# Patient Record
Sex: FEMALE | Race: WHITE | Employment: FULL TIME | URBAN - METROPOLITAN AREA
[De-identification: names, ages, dates, MRNs, and addresses within clinical notes are randomized per-mention and may not be internally consistent; named-entity substitution may affect disease eponyms.]

---

## 2019-11-19 NOTE — PERIOP NOTES
Patient does not have her medication list with her at this time. She will provide during admission process. [address] [place] [address] Dr Mittal Preprocedure Instructions      1. On the day of your surgery, please report to registration located on the 2nd floor of the  [facility]. yes    2. You must have a responsible adult to drive you to the hospital, stay at the hospital during your procedure and drive you home. If they leave your procedure will not be started (no exceptions). yes    3. Do not have anything to eat or drink (including water, gum, mints, coffee, and juice) after midnight. This does not apply to the medications you were instructed to take by your physician. yesIf you are currently taking Plavix, Coumadin, Aspirin, or other blood-thinning agents, contact your physician for special instructions. Yes. Plavix last dosed on 11/18/2019. 4. If you are having a procedure that requires bowel prep: We recommend that you have only clear liquids the day before your procedure and begin your bowel prep by 5:00 pm.  You may continue to drink clear liquids until midnight. If for any reason you are not able to complete your prep please notify your physician immediately. not applicable    5. Have a list of all current medications, including vitamins, herbal supplements and any other over the counter medications. yes    6. If you wear glasses, contacts, dentures and/or hearing aids, they may be removed prior to procedure, please bring a case to store them in. yes    7. You should understand that if you do not follow these instructions your procedure may be cancelled. If your physical condition changes (I.e. fever, cold or flu) please contact your doctor as soon as possible. 8. It is important that you be on time.   If for any reason you are unable to keep your appointment please call [phone] the day of or your physicians office prior to your scheduled procedure

## 2019-11-21 ENCOUNTER — HOSPITAL ENCOUNTER (OUTPATIENT)
Age: 64
Setting detail: OUTPATIENT SURGERY
Discharge: HOME | End: 2019-11-21
Attending: INTERNAL MEDICINE | Admitting: INTERNAL MEDICINE
Payer: COMMERCIAL

## 2019-11-21 ENCOUNTER — APPOINTMENT (OUTPATIENT)
Dept: GENERAL RADIOLOGY | Age: 64
End: 2019-11-21
Attending: INTERNAL MEDICINE
Payer: COMMERCIAL

## 2019-11-21 ENCOUNTER — ANESTHESIA EVENT (OUTPATIENT)
Dept: ENDOSCOPY | Age: 64
End: 2019-11-21
Payer: COMMERCIAL

## 2019-11-21 ENCOUNTER — ANESTHESIA (OUTPATIENT)
Dept: ENDOSCOPY | Age: 64
End: 2019-11-21
Payer: COMMERCIAL

## 2019-11-21 VITALS
RESPIRATION RATE: 19 BRPM | DIASTOLIC BLOOD PRESSURE: 82 MMHG | HEART RATE: 61 BPM | OXYGEN SATURATION: 97 % | WEIGHT: 171.08 LBS | SYSTOLIC BLOOD PRESSURE: 164 MMHG | BODY MASS INDEX: 28.5 KG/M2 | TEMPERATURE: 98.8 F | HEIGHT: 65 IN

## 2019-11-21 PROCEDURE — 77030010603 HC BLN DEV INFL BSC -B: Performed by: INTERNAL MEDICINE

## 2019-11-21 PROCEDURE — 76040000007: Performed by: INTERNAL MEDICINE

## 2019-11-21 PROCEDURE — C2625 STENT, NON-COR, TEM W/DEL SY: HCPCS | Performed by: INTERNAL MEDICINE

## 2019-11-21 PROCEDURE — 74011250637 HC RX REV CODE- 250/637: Performed by: INTERNAL MEDICINE

## 2019-11-21 PROCEDURE — 77030009038 HC CATH BILI STN RTVR BSC -C: Performed by: INTERNAL MEDICINE

## 2019-11-21 PROCEDURE — 74011636320 HC RX REV CODE- 636/320: Performed by: INTERNAL MEDICINE

## 2019-11-21 PROCEDURE — 77030007288 HC DEV LOK BILI BSC -A: Performed by: INTERNAL MEDICINE

## 2019-11-21 PROCEDURE — 77030012596 HC SPHNTOM BILI BSC -E: Performed by: INTERNAL MEDICINE

## 2019-11-21 PROCEDURE — 74011250636 HC RX REV CODE- 250/636: Performed by: INTERNAL MEDICINE

## 2019-11-21 PROCEDURE — 77030010104 HC SEAL PRT ENDOSC BYRN -B: Performed by: INTERNAL MEDICINE

## 2019-11-21 PROCEDURE — 74011000250 HC RX REV CODE- 250: Performed by: NURSE ANESTHETIST, CERTIFIED REGISTERED

## 2019-11-21 PROCEDURE — 76060000032 HC ANESTHESIA 0.5 TO 1 HR: Performed by: INTERNAL MEDICINE

## 2019-11-21 PROCEDURE — 74011250636 HC RX REV CODE- 250/636: Performed by: NURSE ANESTHETIST, CERTIFIED REGISTERED

## 2019-11-21 PROCEDURE — 77030013992 HC SNR POLYP ENDOSC BSC -B: Performed by: INTERNAL MEDICINE

## 2019-11-21 DEVICE — BILIARY STENT WITH NAVIFLEXTM RX DELIVERY SYSTEM
Type: IMPLANTABLE DEVICE | Site: BILE DUCT | Status: FUNCTIONAL
Brand: ADVANIX™ BILIARY

## 2019-11-21 RX ORDER — DULOXETIN HYDROCHLORIDE 60 MG/1
60 CAPSULE, DELAYED RELEASE ORAL DAILY
COMMUNITY

## 2019-11-21 RX ORDER — PYRIDOXINE HCL (VITAMIN B6) 100 MG
100 TABLET ORAL DAILY
COMMUNITY

## 2019-11-21 RX ORDER — CARVEDILOL 6.25 MG/1
6.25 TABLET ORAL 2 TIMES DAILY WITH MEALS
COMMUNITY

## 2019-11-21 RX ORDER — LIDOCAINE HYDROCHLORIDE 20 MG/ML
INJECTION, SOLUTION EPIDURAL; INFILTRATION; INTRACAUDAL; PERINEURAL AS NEEDED
Status: DISCONTINUED | OUTPATIENT
Start: 2019-11-21 | End: 2019-11-21 | Stop reason: HOSPADM

## 2019-11-21 RX ORDER — ALBUTEROL SULFATE 90 UG/1
2 AEROSOL, METERED RESPIRATORY (INHALATION)
COMMUNITY

## 2019-11-21 RX ORDER — VALACYCLOVIR HYDROCHLORIDE 500 MG/1
TABLET, FILM COATED ORAL 2 TIMES DAILY
COMMUNITY

## 2019-11-21 RX ORDER — HEPARIN 100 UNIT/ML
300 SYRINGE INTRAVENOUS AS NEEDED
Status: DISCONTINUED | OUTPATIENT
Start: 2019-11-21 | End: 2019-11-21 | Stop reason: HOSPADM

## 2019-11-21 RX ORDER — SUCCINYLCHOLINE CHLORIDE 20 MG/ML
INJECTION INTRAMUSCULAR; INTRAVENOUS AS NEEDED
Status: DISCONTINUED | OUTPATIENT
Start: 2019-11-21 | End: 2019-11-21 | Stop reason: HOSPADM

## 2019-11-21 RX ORDER — DIPHENHYDRAMINE HYDROCHLORIDE 50 MG/ML
50 INJECTION, SOLUTION INTRAMUSCULAR; INTRAVENOUS ONCE
Status: COMPLETED | OUTPATIENT
Start: 2019-11-21 | End: 2019-11-21

## 2019-11-21 RX ORDER — EZETIMIBE 10 MG/1
TABLET ORAL
COMMUNITY

## 2019-11-21 RX ORDER — MONTELUKAST SODIUM 10 MG/1
10 TABLET ORAL DAILY
COMMUNITY

## 2019-11-21 RX ORDER — PROPOFOL 10 MG/ML
INJECTION, EMULSION INTRAVENOUS AS NEEDED
Status: DISCONTINUED | OUTPATIENT
Start: 2019-11-21 | End: 2019-11-21 | Stop reason: HOSPADM

## 2019-11-21 RX ORDER — PANTOPRAZOLE SODIUM 40 MG/1
40 TABLET, DELAYED RELEASE ORAL DAILY
COMMUNITY

## 2019-11-21 RX ORDER — SODIUM CHLORIDE, SODIUM LACTATE, POTASSIUM CHLORIDE, CALCIUM CHLORIDE 600; 310; 30; 20 MG/100ML; MG/100ML; MG/100ML; MG/100ML
INJECTION, SOLUTION INTRAVENOUS
Status: DISCONTINUED | OUTPATIENT
Start: 2019-11-21 | End: 2019-11-21 | Stop reason: HOSPADM

## 2019-11-21 RX ORDER — PROCHLORPERAZINE MALEATE 10 MG
5 TABLET ORAL
COMMUNITY

## 2019-11-21 RX ORDER — ATORVASTATIN CALCIUM 10 MG/1
10 TABLET, FILM COATED ORAL DAILY
COMMUNITY

## 2019-11-21 RX ORDER — CHOLECALCIFEROL (VITAMIN D3) 125 MCG
CAPSULE ORAL
COMMUNITY

## 2019-11-21 RX ORDER — CLOPIDOGREL BISULFATE 75 MG/1
75 TABLET ORAL
COMMUNITY

## 2019-11-21 RX ORDER — DOCUSATE SODIUM 100 MG/1
100 CAPSULE, LIQUID FILLED ORAL 2 TIMES DAILY
Status: ON HOLD | COMMUNITY
End: 2020-03-26

## 2019-11-21 RX ORDER — EPINEPHRINE 0.3 MG/.3ML
0.3 INJECTION SUBCUTANEOUS
COMMUNITY

## 2019-11-21 RX ORDER — ONDANSETRON HYDROCHLORIDE 8 MG/1
8 TABLET, FILM COATED ORAL
COMMUNITY

## 2019-11-21 RX ADMIN — INDOMETHACIN 100 MG: 50 SUPPOSITORY RECTAL at 16:34

## 2019-11-21 RX ADMIN — SODIUM CHLORIDE, POTASSIUM CHLORIDE, SODIUM LACTATE AND CALCIUM CHLORIDE: 600; 310; 30; 20 INJECTION, SOLUTION INTRAVENOUS at 16:25

## 2019-11-21 RX ADMIN — SUCCINYLCHOLINE CHLORIDE 100 MG: 20 INJECTION, SOLUTION INTRAMUSCULAR; INTRAVENOUS; PARENTERAL at 16:29

## 2019-11-21 RX ADMIN — LIDOCAINE HYDROCHLORIDE 60 MG: 20 INJECTION, SOLUTION INTRAVENOUS at 16:29

## 2019-11-21 RX ADMIN — PROPOFOL 150 MG: 10 INJECTION, EMULSION INTRAVENOUS at 16:29

## 2019-11-21 RX ADMIN — METHYLPREDNISOLONE SODIUM SUCCINATE 125 MG: 125 INJECTION, POWDER, FOR SOLUTION INTRAMUSCULAR; INTRAVENOUS at 16:26

## 2019-11-21 RX ADMIN — IOPAMIDOL 10.5 ML: 612 INJECTION, SOLUTION INTRAVENOUS at 16:58

## 2019-11-21 RX ADMIN — DIPHENHYDRAMINE HYDROCHLORIDE 50 MG: 50 INJECTION, SOLUTION INTRAMUSCULAR; INTRAVENOUS at 16:26

## 2019-11-21 RX ADMIN — HEPARIN 500 UNITS: 100 SYRINGE at 17:49

## 2019-11-21 NOTE — ROUTINE PROCESS
[facility] 1955 
[id number] Situation: 
Verbal report received from: Racheal Procedure: Procedure(s): ENDOSCOPIC RETROGRADE CHOLANGIOPANCREATOGRAPHY (ERCP) ENDOSCOPIC SPHINCTEROTOMY 
ENDOSCOPIC STONE EXTRACTION/BALLOON [address] 
BILIARY STENT PLACEMENT Background: 
 
Preoperative diagnosis: abnormal CT Postoperative diagnosis: * No post-op diagnosis entered * :  Dr. Marivel Vale Assistant(s): Endoscopy Technician-1: Matthias Pepper Endoscopy RN-1: Nirmala Marquez 
Endoscopy RN-Relief: Li Husbands, RN Specimens: * No specimens in log * H. Pylori  no Assessment: 
Intra-procedure medications Anesthesia gave intra-procedure sedation and medications, see anesthesia flow sheet yes Intravenous fluids: NS@ Darra Moons Vital signs stable Abdominal assessment: round and soft Recommendation: 
Discharge patient per MD order. Family or Friend Permission to share finding with family or friend yes

## 2019-11-21 NOTE — PERIOP NOTES
1534- Patient resting comfortably on stretcher, HOB elevated, VS stable, call bell within reach, waiting for procedure start, will continue to monitor pt. SCDs places on patient prior to procedure. 1547- Patient gets hives with contrast we will be using for procedure. Made Dr. Yelena Sanches aware. Awaiting instructions.

## 2019-11-21 NOTE — ANESTHESIA POSTPROCEDURE EVALUATION
Procedure(s):  ENDOSCOPIC RETROGRADE CHOLANGIOPANCREATOGRAPHY (ERCP) with Fluoro  ENDOSCOPIC SPHINCTEROTOMY  ENDOSCOPIC STONE EXTRACTION/BALLOON SWEEP  BILIARY STENT PLACEMENT. general    Anesthesia Post Evaluation      Multimodal analgesia: multimodal analgesia not used between 6 hours prior to anesthesia start to PACU discharge  Patient location during evaluation: PACU  Patient participation: complete - patient participated  Level of consciousness: awake and alert  Pain score: 0  Pain management: adequate  Airway patency: patent  Anesthetic complications: no  Cardiovascular status: hemodynamically stable and acceptable  Respiratory status: acceptable  Hydration status: acceptable  Comments: Patient seen and evaluated; no concerns. Post anesthesia nausea and vomiting:  none      Vitals Value Taken Time   /81 11/21/2019  5:38 PM   Temp 37.1 °C (98.8 °F) 11/21/2019  5:13 PM   Pulse 71 11/21/2019  5:43 PM   Resp 22 11/21/2019  5:43 PM   SpO2 96 % 11/21/2019  5:43 PM   Vitals shown include unvalidated device data.

## 2019-11-21 NOTE — H&P
Vicky Castanon M.D.  [phone]            History and Physical       NAME:  Shaniqua Kerr   :   1955   MRN:   [id number]       Referring Physician:  Dr. Noah Fuller Date: 2019 4:26 PM    Chief Complaint:  Biliary obstruction    History of Present Illness:  Patient is a 75 y.o. who is seen for jaundice secondary to biliary obstruction with recent CT scan showing soft tissue mass noted at the angelica hepatis causing biliary obstruction as well as multiple hypodense lesions in the right hepatic lobe. She has a history of metastatic breast cancer. PMH:  Past Medical History:   Diagnosis Date    Asthma     Cancer ([address]     lung, breast    Hyperlipidemia     Hypertension     Ill-defined condition     amaurosis fugax       PSH:  Past Surgical History:   Procedure Laterality Date    BREAST SURGERY PROCEDURE UNLISTED      lumpectomy    HX GYN      MELODY, C section x3    HX HEENT      tonsillectomy    HX HYSTERECTOMY      HX VASCULAR ACCESS         Allergies: Allergies   Allergen Reactions    Seafood Anaphylaxis    Adhesive Rash    Contrast Agent [Iodine] Hives       Home Medications:  Prior to Admission Medications   Prescriptions Last Dose Informant Patient Reported? Taking? DULoxetine (CYMBALTA) 60 mg capsule 2019 at pm   Yes Yes   Sig: Take 60 mg by mouth daily. EPINEPHrine (EPIPEN) 0.3 mg/0.3 mL injection   Yes Yes   Si.3 mg by IntraMUSCular route once as needed. LOSARTAN PO 2019 at am  Yes Yes   Sig: Take  by mouth. albuterol (PROAIR HFA) 90 mcg/actuation inhaler  at as needed  Yes Yes   Sig: Take 2 Puffs by inhalation. atorvastatin (LIPITOR) 10 mg tablet 2019 at pm  Yes Yes   Sig: Take 10 mg by mouth daily. calcium carbonate (CALCIUM 500 PO) 2019 at am  Yes Yes   Sig: Take 500 mg by mouth.    carvedilol (COREG) 6.25 mg tablet 2019 at am  Yes Yes   Sig: Take 6.25 mg by mouth two (2) times daily (with meals). cholecalciferol, vitamin D3, (VITAMIN D3) 2,000 unit tab 11/21/2019 at am  Yes Yes   Sig: Take  by mouth. clopidogrel (PLAVIX) 75 mg tab 11/18/2019  Yes Yes   Sig: Take 75 mg by mouth. denosumab (XGEVA SC) 10/24/2019  Yes Yes   Sig: by SubCUTAneous route. docusate sodium (COLACE) 100 mg capsule  at as needed  Yes Yes   Sig: Take 100 mg by mouth two (2) times a day.   ezetimibe (ZETIA) 10 mg tablet 11/20/2019 at pm  Yes Yes   Sig: Take  by mouth.   klack's mixture Solution  at as needed  Yes Yes   Sig: Take  by mouth every six (6) hours. Diphenhydramine elixir 12.5mg/ml 500ml, Nystatin suspension 120ml,Tetracycline 500mg capsules  12 capsules (6g), Hydrocortisone 20mg tablet 12 tablets (240mg), Water for irrigation QS ad 1000ml   montelukast (SINGULAIR) 10 mg tablet 11/20/2019 at pm  Yes Yes   Sig: Take 10 mg by mouth daily. ondansetron hcl (ZOFRAN) 8 mg tablet  at as needed  Yes Yes   Sig: Take 8 mg by mouth every eight (8) hours as needed for Nausea. pantoprazole (PROTONIX) 40 mg tablet 11/21/2019 at am  Yes Yes   Sig: Take 40 mg by mouth daily. prochlorperazine (COMPAZINE) 10 mg tablet  at as needed  Yes Yes   Sig: Take 5 mg by mouth every six (6) hours as needed. pyridoxine, vitamin B6, (VITAMIN B-6) 100 mg tablet 11/21/2019 at am  Yes Yes   Sig: Take 100 mg by mouth daily. valACYclovir (VALTREX) 500 mg tablet 11/21/2019 at am  Yes Yes   Sig: Take  by mouth two (2) times a day.       Facility-Administered Medications: None       Hospital Medications:  Current Facility-Administered Medications   Medication Dose Route Frequency    glucagon (GLUCAGEN) injection 1 mg  1 mg IntraVENous ONCE    iopamidol (ISOVUE 300) 61 % contrast injection 100-200 mL  100-200 mL IntraCATHeter RAD ONCE    indomethacin (INDOCIN) rectal suppository 100 mg  100 mg Rectal ONCE    diphenhydrAMINE (BENADRYL) injection 50 mg  50 mg IntraVENous ONCE    methylPREDNISolone (PF) (SOLU-MEDROL) injection 40 mg 40 mg IntraVENous ONCE       Social History:  Social History     Tobacco Use    Smoking status: Former Smoker     Packs/day: 0.50     Years: 10.00     Pack years: 5.00     Last attempt to quit: 1999     Years since quittin.0    Smokeless tobacco: Never Used   Substance Use Topics    Alcohol use: Never     Frequency: Never       Family History:  History reviewed. No pertinent family history. Review of Systems:      Constitutional: negative fever, negative chills, negative weight loss  Eyes:   negative visual changes  ENT:   negative sore throat, tongue or lip swelling  Respiratory:  negative cough, negative dyspnea  Cards:  negative for chest pain, palpitations, lower extremity edema  GI:   See HPI  :  negative for frequency, dysuria  Integument:  negative for rash and pruritus  Heme:  negative for easy bruising and gum/nose bleeding  Musculoskel: negative for myalgias,  back pain and muscle weakness  Neuro: negative for headaches, dizziness, vertigo  Psych:  negative for feelings of anxiety, depression       Objective:     Patient Vitals for the past 8 hrs:   BP Temp Pulse Resp SpO2 Height Weight   19 1522 118/79 98 °F (36.7 °C) 67 16 96 % 5' 5\" (1.651 m) 77.6 kg (171 lb 1.2 oz)     No intake/output data recorded. No intake/output data recorded. EXAM:     NEURO-a&o   HEENT-wnl   LUNGS-clear    COR-regular rate and rhythym     ABD-soft , no tenderness, no rebound, good bs     EXT-no edema     Data Review     No results for input(s): WBC, HGB, HCT, PLT, HGBEXT, HCTEXT, PLTEXT in the last 72 hours. No results for input(s): NA, K, CL, CO2, BUN, CREA, GLU, PHOS, CA in the last 72 hours. No results for input(s): SGOT, GPT, AP, TBIL, TP, ALB, GLOB, GGT, AML, LPSE in the last 72 hours. No lab exists for component: AMYP, HLPSE  No results for input(s): INR, PTP, APTT, INREXT in the last 72 hours. There is no problem list on file for this patient.      Assessment:   · Biliary obstruction to metastatic breast cancer   Plan:   · ERCP and stent placement today.      Signed By: She Swann MD     11/21/2019  4:26 PM

## 2019-11-21 NOTE — ANESTHESIA PREPROCEDURE EVALUATION
Relevant Problems   No relevant active problems       Anesthetic History   No history of anesthetic complications            Review of Systems / Medical History  Patient summary reviewed and pertinent labs reviewed    Pulmonary            Asthma : well controlled       Neuro/Psych   Within defined limits           Cardiovascular    Hypertension: well controlled                   GI/Hepatic/Renal  Within defined limits              Endo/Other        Cancer     Other Findings   Comments: Metastatic breast CA           Physical Exam    Airway  Mallampati: II  TM Distance: 4 - 6 cm  Neck ROM: normal range of motion   Mouth opening: Normal     Cardiovascular  Regular rate and rhythm,  S1 and S2 normal,  no murmur, click, rub, or gallop  Rhythm: regular  Rate: normal         Dental  No notable dental hx       Pulmonary  Breath sounds clear to auscultation               Abdominal  GI exam deferred       Other Findings            Anesthetic Plan    ASA: 3  Anesthesia type: general          Induction: Intravenous  Anesthetic plan and risks discussed with: Patient

## 2019-11-21 NOTE — PERIOP NOTES
1626 Procedure being performed under MAC; RICHAR Sapp at bedside monitoring patient. See anesthesia notes. 1657 Endoscope was pre-cleaned at bedside immediately following procedure by Jaqueline Ya. 1707 Patient received Lidocaine, Solu-medrol, Propofol, and Succinylcholine, per RICHAR Sapp. Care of patient assumed from the anesthesia provider. Patient tolerated procedure well. Abdomen remains soft and non tender post procedure, no complaints or indication of discomfort noted at this time. See anesthesia note. Patient transferred to Endoscopy Recovery and report given to recovery nurse.

## 2020-03-26 ENCOUNTER — ANESTHESIA EVENT (OUTPATIENT)
Dept: ENDOSCOPY | Age: 65
End: 2020-03-26
Payer: COMMERCIAL

## 2020-03-26 ENCOUNTER — HOSPITAL ENCOUNTER (OUTPATIENT)
Age: 65
Setting detail: OUTPATIENT SURGERY
Discharge: HOME | End: 2020-03-26
Attending: INTERNAL MEDICINE | Admitting: INTERNAL MEDICINE
Payer: COMMERCIAL

## 2020-03-26 ENCOUNTER — APPOINTMENT (OUTPATIENT)
Dept: GENERAL RADIOLOGY | Age: 65
End: 2020-03-26
Attending: INTERNAL MEDICINE
Payer: COMMERCIAL

## 2020-03-26 ENCOUNTER — ANESTHESIA (OUTPATIENT)
Dept: ENDOSCOPY | Age: 65
End: 2020-03-26
Payer: COMMERCIAL

## 2020-03-26 VITALS
HEIGHT: 64 IN | BODY MASS INDEX: 22.13 KG/M2 | TEMPERATURE: 97.6 F | DIASTOLIC BLOOD PRESSURE: 64 MMHG | WEIGHT: 129.63 LBS | RESPIRATION RATE: 27 BRPM | SYSTOLIC BLOOD PRESSURE: 131 MMHG | HEART RATE: 87 BPM | OXYGEN SATURATION: 98 %

## 2020-03-26 PROCEDURE — 74011636320 HC RX REV CODE- 636/320: Performed by: INTERNAL MEDICINE

## 2020-03-26 PROCEDURE — C1874 STENT, COATED/COV W/DEL SYS: HCPCS | Performed by: INTERNAL MEDICINE

## 2020-03-26 PROCEDURE — 74011250636 HC RX REV CODE- 250/636: Performed by: INTERNAL MEDICINE

## 2020-03-26 PROCEDURE — 77030031656 HC FCPS ENDO GRSP DISP BSC -B: Performed by: INTERNAL MEDICINE

## 2020-03-26 PROCEDURE — 77030008684 HC TU ET CUF COVD -B: Performed by: ANESTHESIOLOGY

## 2020-03-26 PROCEDURE — 74011250636 HC RX REV CODE- 250/636: Performed by: NURSE ANESTHETIST, CERTIFIED REGISTERED

## 2020-03-26 PROCEDURE — 77030010104 HC SEAL PRT ENDOSC BYRN -B: Performed by: INTERNAL MEDICINE

## 2020-03-26 PROCEDURE — 76040000007: Performed by: INTERNAL MEDICINE

## 2020-03-26 PROCEDURE — 76060000032 HC ANESTHESIA 0.5 TO 1 HR: Performed by: INTERNAL MEDICINE

## 2020-03-26 PROCEDURE — 77030018846 HC SOL IRR STRL H20 ICUM -A: Performed by: INTERNAL MEDICINE

## 2020-03-26 PROCEDURE — 77030007288 HC DEV LOK BILI BSC -A: Performed by: INTERNAL MEDICINE

## 2020-03-26 PROCEDURE — C1769 GUIDE WIRE: HCPCS | Performed by: INTERNAL MEDICINE

## 2020-03-26 PROCEDURE — 77030012596 HC SPHNTOM BILI BSC -E: Performed by: INTERNAL MEDICINE

## 2020-03-26 PROCEDURE — 74011000250 HC RX REV CODE- 250: Performed by: NURSE ANESTHETIST, CERTIFIED REGISTERED

## 2020-03-26 PROCEDURE — 77030013992 HC SNR POLYP ENDOSC BSC -B: Performed by: INTERNAL MEDICINE

## 2020-03-26 RX ORDER — ATROPINE SULFATE 0.1 MG/ML
0.4 INJECTION INTRAVENOUS
Status: DISCONTINUED | OUTPATIENT
Start: 2020-03-26 | End: 2020-03-26 | Stop reason: HOSPADM

## 2020-03-26 RX ORDER — MIDAZOLAM HYDROCHLORIDE 1 MG/ML
INJECTION, SOLUTION INTRAMUSCULAR; INTRAVENOUS AS NEEDED
Status: DISCONTINUED | OUTPATIENT
Start: 2020-03-26 | End: 2020-03-26 | Stop reason: HOSPADM

## 2020-03-26 RX ORDER — DEXTROMETHORPHAN/PSEUDOEPHED 2.5-7.5/.8
1.2 DROPS ORAL
Status: DISCONTINUED | OUTPATIENT
Start: 2020-03-26 | End: 2020-03-26 | Stop reason: HOSPADM

## 2020-03-26 RX ORDER — NALOXONE HYDROCHLORIDE 0.4 MG/ML
0.4 INJECTION, SOLUTION INTRAMUSCULAR; INTRAVENOUS; SUBCUTANEOUS
Status: ACTIVE | OUTPATIENT
Start: 2020-03-26 | End: 2020-03-26

## 2020-03-26 RX ORDER — SODIUM CHLORIDE, SODIUM LACTATE, POTASSIUM CHLORIDE, CALCIUM CHLORIDE 600; 310; 30; 20 MG/100ML; MG/100ML; MG/100ML; MG/100ML
100 INJECTION, SOLUTION INTRAVENOUS CONTINUOUS
Status: DISCONTINUED | OUTPATIENT
Start: 2020-03-26 | End: 2020-03-26 | Stop reason: HOSPADM

## 2020-03-26 RX ORDER — SUCCINYLCHOLINE CHLORIDE 20 MG/ML
INJECTION INTRAMUSCULAR; INTRAVENOUS AS NEEDED
Status: DISCONTINUED | OUTPATIENT
Start: 2020-03-26 | End: 2020-03-26 | Stop reason: HOSPADM

## 2020-03-26 RX ORDER — SODIUM CHLORIDE 9 MG/ML
50 INJECTION, SOLUTION INTRAVENOUS CONTINUOUS
Status: DISPENSED | OUTPATIENT
Start: 2020-03-26 | End: 2020-03-26

## 2020-03-26 RX ORDER — EPINEPHRINE 0.1 MG/ML
1 INJECTION INTRACARDIAC; INTRAVENOUS
Status: DISCONTINUED | OUTPATIENT
Start: 2020-03-26 | End: 2020-03-26 | Stop reason: HOSPADM

## 2020-03-26 RX ORDER — ROCURONIUM BROMIDE 10 MG/ML
INJECTION, SOLUTION INTRAVENOUS AS NEEDED
Status: DISCONTINUED | OUTPATIENT
Start: 2020-03-26 | End: 2020-03-26 | Stop reason: HOSPADM

## 2020-03-26 RX ORDER — LIDOCAINE HYDROCHLORIDE 20 MG/ML
INJECTION, SOLUTION EPIDURAL; INFILTRATION; INTRACAUDAL; PERINEURAL AS NEEDED
Status: DISCONTINUED | OUTPATIENT
Start: 2020-03-26 | End: 2020-03-26 | Stop reason: HOSPADM

## 2020-03-26 RX ORDER — PROPOFOL 10 MG/ML
INJECTION, EMULSION INTRAVENOUS
Status: DISCONTINUED | OUTPATIENT
Start: 2020-03-26 | End: 2020-03-26 | Stop reason: HOSPADM

## 2020-03-26 RX ORDER — AMOXICILLIN 250 MG
1 CAPSULE ORAL AS NEEDED
COMMUNITY

## 2020-03-26 RX ORDER — FLUMAZENIL 0.1 MG/ML
0.2 INJECTION INTRAVENOUS
Status: ACTIVE | OUTPATIENT
Start: 2020-03-26 | End: 2020-03-26

## 2020-03-26 RX ORDER — AMLODIPINE BESYLATE 10 MG/1
10 TABLET ORAL DAILY
COMMUNITY

## 2020-03-26 RX ORDER — PROPOFOL 10 MG/ML
INJECTION, EMULSION INTRAVENOUS AS NEEDED
Status: DISCONTINUED | OUTPATIENT
Start: 2020-03-26 | End: 2020-03-26 | Stop reason: HOSPADM

## 2020-03-26 RX ORDER — MIDAZOLAM HYDROCHLORIDE 1 MG/ML
.25-5 INJECTION, SOLUTION INTRAMUSCULAR; INTRAVENOUS
Status: DISCONTINUED | OUTPATIENT
Start: 2020-03-26 | End: 2020-03-26 | Stop reason: HOSPADM

## 2020-03-26 RX ADMIN — PROPOFOL 150 MG: 10 INJECTION, EMULSION INTRAVENOUS at 09:01

## 2020-03-26 RX ADMIN — GLUCAGON HYDROCHLORIDE 0.5 MG: KIT at 09:23

## 2020-03-26 RX ADMIN — LIDOCAINE HYDROCHLORIDE 40 MG: 20 INJECTION, SOLUTION INTRAVENOUS at 09:01

## 2020-03-26 RX ADMIN — SUCCINYLCHOLINE CHLORIDE 100 MG: 20 INJECTION, SOLUTION INTRAMUSCULAR; INTRAVENOUS; PARENTERAL at 09:01

## 2020-03-26 RX ADMIN — SODIUM CHLORIDE, SODIUM LACTATE, POTASSIUM CHLORIDE, AND CALCIUM CHLORIDE: 600; 310; 30; 20 INJECTION, SOLUTION INTRAVENOUS at 08:29

## 2020-03-26 RX ADMIN — SODIUM CHLORIDE, SODIUM LACTATE, POTASSIUM CHLORIDE, AND CALCIUM CHLORIDE 100 ML/HR: 600; 310; 30; 20 INJECTION, SOLUTION INTRAVENOUS at 08:06

## 2020-03-26 RX ADMIN — ROCURONIUM BROMIDE 10 MG: 10 INJECTION, SOLUTION INTRAVENOUS at 09:01

## 2020-03-26 RX ADMIN — IOPAMIDOL 21 ML: 612 INJECTION, SOLUTION INTRAVENOUS at 09:29

## 2020-03-26 RX ADMIN — MIDAZOLAM 1 MG: 1 INJECTION INTRAMUSCULAR; INTRAVENOUS at 09:04

## 2020-03-26 RX ADMIN — MIDAZOLAM 2 MG: 1 INJECTION INTRAMUSCULAR; INTRAVENOUS at 08:59

## 2020-03-26 RX ADMIN — PROPOFOL 100 MCG/KG/MIN: 10 INJECTION, EMULSION INTRAVENOUS at 09:05

## 2020-03-26 NOTE — DISCHARGE INSTRUCTIONS
Tita Marcelo MD   Physician   Gastroenterology   Discharge Summary   Signed   Date of Service:  20 4841                    []Steve copied text    []Octavia for details  Timur Cramer M.D.  [phone]           3/26/2020  Yue Ferrara  :  1955  [facility] Medical Record Number:  [id number]           ENDOSCOPY FINDINGS:              Your endoscopy showed the biliary stricture unchanged, old plastic stent was removed and new metal stent placed with excellent drainage seen.        EGD DISCHARGE INSTRUCTIONS     DISCOMFORT:  Sore throat- throat lozenges or warm salt water gargle  redness at IV site- apply warm compress to area; if redness or soreness persist- contact your physician  Gaseous discomfort- walking, belching will help relieve any discomfort  You may not operate a vehicle for 12 hours  You may not engage in an occupation involving machinery or appliances for rest of today  You may not drink alcoholic beverages for at least 12 hours  Avoid making any critical decisions for at least 24 hour     DIET:              You may resume your regular diet.     ACTIVITY  Spend the remainder of the day resting -  avoid any strenuous activity. Avoid driving or operating [employer] ANY SIGN OF                                         Increasing pain, nausea, vomiting  Abdominal distension (swelling)  New increased bleeding (oral or rectal)  Fever (chills)  Pain in chest area  Bloody discharge from nose or mouth  Shortness of breath     Follow-up Instructions:              Call Dr. Piero Cancino for any questions or problems. Telephone # [phone]                 Continue same medications.  Follow up with Dr. Marlena Curtis and follow-up with us as needed.

## 2020-03-26 NOTE — ANESTHESIA POSTPROCEDURE EVALUATION
Procedure(s):  ENDOSCOPIC RETROGRADE CHOLANGIOPANCREATOGRAPHY (ERCP)- ESSENTIAL  ENDOSCOPY with STENT REMOVAL  ENDOSCOPY WITH STENT PLACEMENT. general    Anesthesia Post Evaluation      Multimodal analgesia: multimodal analgesia not used between 6 hours prior to anesthesia start to PACU discharge  Patient location during evaluation: PACU  Patient participation: complete - patient participated  Level of consciousness: awake and alert  Pain score: 0  Pain management: adequate  Airway patency: patent  Anesthetic complications: no  Cardiovascular status: hemodynamically stable and acceptable  Respiratory status: acceptable  Hydration status: acceptable  Comments: Patient seen and evaluated; no concerns. Post anesthesia nausea and vomiting:  none      Vitals Value Taken Time   /52 3/26/2020 11:09 AM   Temp 36.4 °C (97.6 °F) 3/26/2020  9:55 AM   Pulse 88 3/26/2020 11:12 AM   Resp 25 3/26/2020 11:12 AM   SpO2 97 % 3/26/2020 11:12 AM   Vitals shown include unvalidated device data.

## 2020-03-26 NOTE — PERIOP NOTES
5611  Anesthesia staff at patient's bedside administering anesthesia and monitoring patients vital signs throughout procedure. See anesthesia note. 8092  Endoscope was pre-cleaned at bedside immediately following procedure by jonel Nieto. 8617  Patient tolerated procedure without problems. Abdomen soft and patient arousable and voices no complaints Report received from CRNA, see anesthesia note. Patient transported to endoscopy recovery area. Report given to ABBY CACERES RN.

## 2020-03-26 NOTE — PROGRESS NOTES
Alhaji Carlson [facility]  1955  [id number]    Situation:  Verbal report received from:   Marcine Duane, RN   Procedure: Procedure(s):  ENDOSCOPIC RETROGRADE CHOLANGIOPANCREATOGRAPHY (ERCP)- ESSENTIAL  ENDOSCOPY with STENT REMOVAL  ENDOSCOPY WITH STENT PLACEMENT    Background:    Preoperative diagnosis: BOWEL DUCT STRICTURE  Postoperative diagnosis: Biliary obstruction. :  Dr. Roddy Saeed  Assistant(s): Endoscopy Technician-1: Franko Newton  Endoscopy RN-1: Mykel Smith RN    Specimens: * No specimens in log *  H. Pylori  no    Assessment:  Intra-procedure medications       Anesthesia gave intra-procedure sedation and medications, see anesthesia flow sheet yes    Intravenous fluids: NS@ KVO     Vital signs stable   yes    Abdominal assessment: round and soft   yes    Recommendation:  Discharge patient per MD order yes.   Return to floor  outpatient  Family or Friend   spouse  Permission to share finding with family or friend yes No pertinent past medical history

## 2020-03-26 NOTE — PROCEDURES
Timur Cramer M.D.  [phone]           3/26/2020              ERCP Operative Report    Yue Ferrara  :  1955  [facility] Medical Record Number:  [id number]      Procedure Type:   ERCPwith biliary stent change     Indications: biliary obstruction    : Dwight Delcid MD    Referring Provider:    Oscar Garza MD, Liz Srivastava MD    Exam:  Airway: clear, no airway problems anticipated  Heart: RRR, without gallops or rubs  Lungs: clear bilaterally without wheezes, crackles, or rhonchi  Abdomen: soft, nontender, nondistended, bowel sounds present  Mental Status: awake, alert and oriented to person, place and time    Sedation:  General anesthesia    Procedure Details:  After discussing in detail the procedure with all its risks and potential complications including but not limited to bleeding, perforation, increased risk of pancreatitis with potential lethal complications related to the pancreatitis, sepsis and multiorgan failure and complications related to sedation, informed consent was obtained. Ample amount of time was allowed for questions and alternatives to this procedure were provided. The patient was taken to the fluoroscopy suite and placed in the prone position. Upon sequential sedation as per above, the Olympus duodenoscope [id number]   was inserted via the mouthpiece and carefully advanced to the stomach where all fluid secretions were suctioned and then the scope advanced to the second portion of the duodenum. The quality of visualization was excellent. The duodenoscope was withdrawn into a short position. Findings:   Esophagus:normal  Stomach: normal   Duodenum/jejunum: normal    Ampulla:protruding stent  Cholangiogram: After stent removed, cholangiogram obtained and revealed a tight narrowing in mid-CBD as previously seen, about 3 cm in lenth. No other abnormality or new narrowing seen.   Pancreatogram: Not performed    Specimen Removed:  None    Complications: None. EBL:  None. Interventions:      Pancreatic: none  Biliary: Using a standard snare, the previously placed plastic stent was removed. Cholangiogram was obtained using a dreamtome sphincterotome and the same extrinsic compression was noted in the mid CBD as reported above. A 0.035 Fr guidewire was inserted into the intrahepatic ducts and the sphincterotome was exchanged over the guidewire with a 10 mm wide 80 mm long fully covered metal stent. The stent was deployed under fluoroscopic and endoscopic guidance. Optimal position was obtained. Excellent drainage post deployment was seen. Impression:      Extrinsic biliary obstruction  Plastic stent removed and fully covered metal stent placed. Recommendations:      Follow-up with Dr. Pj Baez liver enzymes  Follow-up in the office as needed          Wendy Bonilla MD  3/26/2020  9:43 AM

## 2020-03-26 NOTE — DISCHARGE SUMMARY
Deborah Warren M.D.  [phone]           3/26/2020  Hugo Ferrara  :  1955  [employer] Medical Record Number:  [id number]        ENDOSCOPY FINDINGS:   Your endoscopy showed the biliary stricture unchanged, old plastic stent was removed and new metal stent placed with excellent drainage seen. EGD DISCHARGE INSTRUCTIONS    DISCOMFORT:  Sore throat- throat lozenges or warm salt water gargle  redness at IV site- apply warm compress to area; if redness or soreness persist- contact your physician  Gaseous discomfort- walking, belching will help relieve any discomfort  You may not operate a vehicle for 12 hours  You may not engage in an occupation involving machinery or appliances for rest of today  You may not drink alcoholic beverages for at least 12 hours  Avoid making any critical decisions for at least 24 hour    DIET:   You may resume your regular diet. ACTIVITY  Spend the remainder of the day resting -  avoid any strenuous activity. Avoid driving or operating machinery. CALL M.D. ANY SIGN OF   Increasing pain, nausea, vomiting  Abdominal distension (swelling)  New increased bleeding (oral or rectal)  Fever (chills)  Pain in chest area  Bloody discharge from nose or mouth  Shortness of breath    Follow-up Instructions:   Call Dr. Roddy Saeed for any questions or problems. Telephone # [phone]     Continue same medications. Follow up with Dr. Jacques Farias and follow-up with us as needed.

## 2020-03-26 NOTE — H&P
Octavio Bird M.D.  [phone]            History and Physical       NAME:  Moy Bowers   :   1955   MRN:   [id number]           Consult Date: 3/26/2020 8:58 AM    Chief Complaint:  Biliary obstruction    History of Present Illness:  Patient is a 75 y.o. who is seen for history of biliary obstruction and plastic stent exchange. Denies any ongoing GI complaints. PMH:  Past Medical History:   Diagnosis Date    Asthma     [facility] ([facility] [address]     breast    Cancer ([facility] [address]     lung    Cancer ([facility] [address] 2019    liver    GERD (gastroesophageal reflux disease)     Herpes simplex     Hyperlipidemia     Hypertension     Ill-defined condition     amaurosis fugax    Neuropathic pain     due to chemo, takes cymbalta       PSH:  Past Surgical History:   Procedure Laterality Date    BREAST SURGERY PROCEDURE UNLISTED      lumpectomy    HX GYN      MELODY, C section x3    HX HEENT      tonsillectomy    HX HYSTERECTOMY      HX OPEN CHOLECYSTECTOMY  2020    drain in place at present  (20)    HX VASCULAR ACCESS         Allergies: Allergies   Allergen Reactions    Seafood Anaphylaxis    Adhesive Rash    Contrast Agent [Iodine] Hives       Home Medications:  Prior to Admission Medications   Prescriptions Last Dose Informant Patient Reported? Taking? DULoxetine (CYMBALTA) 60 mg capsule 3/25/2020 at Unknown time  Yes Yes   Sig: Take 60 mg by mouth daily. Indications: neuropathic pain   EPINEPHrine (EPIPEN) 0.3 mg/0.3 mL injection   Yes No   Si.3 mg by IntraMUSCular route once as needed. LOSARTAN PO Not Taking at Unknown time  Yes No   Sig: Take  by mouth. albuterol (PROAIR HFA) 90 mcg/actuation inhaler Unknown at Unknown time  Yes No   Sig: Take 2 Puffs by inhalation. amLODIPine (NORVASC) 10 mg tablet 3/26/2020 at Unknown time  Yes Yes   Sig: Take 10 mg by mouth daily.  Indications: high blood pressure   atorvastatin (LIPITOR) 10 mg tablet 3/25/2020 at Unknown time  Yes Yes   Sig: Take 10 mg by mouth daily. calcium carbonate (CALCIUM 500 PO) 3/24/2020  Yes No   Sig: Take 500 mg by mouth. carvedilol (COREG) 6.25 mg tablet Not Taking at Unknown time  Yes No   Sig: Take 6.25 mg by mouth two (2) times daily (with meals). cholecalciferol, vitamin D3, (VITAMIN D3) 2,000 unit tab 3/25/2020  Yes No   Sig: Take  by mouth. clopidogrel (PLAVIX) 75 mg tab 10/16/2019 at Unknown time  Yes No   Sig: Take 75 mg by mouth. denosumab (XGEVA SC) Not Taking at Unknown time  Yes No   Sig: by SubCUTAneous route. Indications: breast cancer   ezetimibe (ZETIA) 10 mg tablet 3/25/2020 at Unknown time  Yes Yes   Sig: Take  by mouth.   klack's mixture Solution Unknown at Unknown time  Yes No   Sig: Take  by mouth every six (6) hours. Diphenhydramine elixir 12.5mg/ml 500ml, Nystatin suspension 120ml,Tetracycline 500mg capsules  12 capsules (6g), Hydrocortisone 20mg tablet 12 tablets (240mg), Water for irrigation QS ad 1000ml   montelukast (SINGULAIR) 10 mg tablet 3/25/2020 at Unknown time  Yes Yes   Sig: Take 10 mg by mouth daily. Indications: inflammation of the nose due to an allergy   ondansetron hcl (ZOFRAN) 8 mg tablet Unknown at Unknown time  Yes No   Sig: Take 8 mg by mouth every eight (8) hours as needed for Nausea. pantoprazole (PROTONIX) 40 mg tablet 3/26/2020 at Unknown time  Yes Yes   Sig: Take 40 mg by mouth daily. prochlorperazine (COMPAZINE) 10 mg tablet Unknown at Unknown time  Yes No   Sig: Take 5 mg by mouth every six (6) hours as needed. pyridoxine, vitamin B6, (VITAMIN B-6) 100 mg tablet 3/25/2020 at Unknown time  Yes Yes   Sig: Take 100 mg by mouth daily. senna-docusate (Senokot-S) 8.6-50 mg per tablet 2/26/2020 at Unknown time  Yes Yes   Sig: Take 1 Tab by mouth as needed for Constipation. valACYclovir (VALTREX) 500 mg tablet 3/26/2020 at Unknown time  Yes Yes   Sig: Take  by mouth two (2) times a day.  Indications: herpes simplex Facility-Administered Medications: None       Hospital Medications:  Current Facility-Administered Medications   Medication Dose Route Frequency    0.9% sodium chloride infusion  50 mL/hr IntraVENous CONTINUOUS    midazolam (VERSED) injection 0.25-5 mg  0.25-5 mg IntraVENous Multiple    naloxone (NARCAN) injection 0.4 mg  0.4 mg IntraVENous Multiple    flumazeniL (ROMAZICON) 0.1 mg/mL injection 0.2 mg  0.2 mg IntraVENous Multiple    simethicone (MYLICON) 90GM/8.7YY oral drops 80 mg  1.2 mL Oral Multiple    atropine injection 0.4 mg  0.4 mg IntraVENous ONCE PRN    EPINEPHrine (ADRENALIN) 0.1 mg/mL syringe 1 mg  1 mg Endoscopically ONCE PRN    glucagon (GLUCAGEN) injection 1 mg  1 mg IntraVENous ONCE    iopamidoL (ISOVUE 300) 61 % contrast injection 100-200 mL  100-200 mL IntraCATHeter RAD ONCE    lactated Ringers infusion  100 mL/hr IntraVENous CONTINUOUS       Social History:  Social History     Tobacco Use    Smoking status: Former Smoker     Packs/day: 0.50     Years: 10.00     Pack years: 5.00     Last attempt to quit: 1999     Years since quittin.3    Smokeless tobacco: Never Used   Substance Use Topics    Alcohol use: Never     Frequency: Never       Family History:  History reviewed. No pertinent family history.           Review of Systems:      Constitutional: negative fever, negative chills, negative weight loss  Eyes:   negative visual changes  ENT:   negative sore throat, tongue or lip swelling  Respiratory:  negative cough, negative dyspnea  Cards:  negative for chest pain, palpitations, lower extremity edema  GI:   See HPI  :  negative for frequency, dysuria  Integument:  negative for rash and pruritus  Heme:  negative for easy bruising and gum/nose bleeding  Musculoskel: negative for myalgias,  back pain and muscle weakness  Neuro: negative for headaches, dizziness, vertigo  Psych:  negative for feelings of anxiety, depression       Objective:     Patient Vitals for the past 8 hrs:   BP Temp Pulse Resp SpO2 Height Weight   03/26/20 0739 145/59 98.1 °F (36.7 °C) 88 17 99 %     03/26/20 0709      5' 4\" (1.626 m) 58.8 kg (129 lb 10.1 oz)     No intake/output data recorded. No intake/output data recorded. EXAM:     NEURO-a&o   HEENT-wnl   LUNGS-clear    COR-regular rate and rhythym     ABD-soft , no tenderness, no rebound, good bs     EXT-no edema     Data Review     No results for input(s): WBC, HGB, HCT, PLT, HGBEXT, HCTEXT, PLTEXT in the last 72 hours. No results for input(s): NA, K, CL, CO2, BUN, CREA, GLU, PHOS, CA in the last 72 hours. No results for input(s): SGOT, GPT, AP, TBIL, TP, ALB, GLOB, GGT, AML, LPSE in the last 72 hours. No lab exists for component: AMYP, HLPSE  No results for input(s): INR, PTP, APTT, INREXT in the last 72 hours. There is no problem list on file for this patient. Assessment:   · Biliary obstruction from extrinsic compression  · Plastic stent exchange   Plan:   · ERCP today.      Signed By: Unique Monroe MD     3/26/2020  8:58 AM

## 2020-03-26 NOTE — ANESTHESIA PREPROCEDURE EVALUATION
Relevant Problems   No relevant active problems       Anesthetic History   No history of anesthetic complications            Review of Systems / Medical History  Patient summary reviewed and nursing notes reviewed    Pulmonary            Asthma : well controlled       Neuro/Psych   Within defined limits           Cardiovascular    Hypertension: well controlled      CHF    Hyperlipidemia    Exercise tolerance: >4 METS     GI/Hepatic/Renal     GERD: well controlled           Endo/Other        Cancer    Comments: Right Breast cancer to lung, liver, lung Other Findings   Comments: Herpes simplex; not active presently           Physical Exam    Airway  Mallampati: II    Neck ROM: normal range of motion   Mouth opening: Normal     Cardiovascular    Rhythm: regular  Rate: normal         Dental    Dentition: Caps/crowns     Pulmonary  Breath sounds clear to auscultation               Abdominal         Other Findings            Anesthetic Plan    ASA: 3  Anesthesia type: general          Induction: Intravenous  Anesthetic plan and risks discussed with: Patient      Informed consent obtained.

## 2021-07-07 NOTE — PROCEDURES
Marlene Ramsey M.D.  [phone]           2019              ERCP Operative Report    Kimmy Ferrara  :  1955  [facility] Medical Record Number:  [id number]      Procedure Type:   Zenaida Barron biliary sphincterotomy, biliary stent placement     Indications: jaundice, biliary obstruction    : Kristen Mcdonald MD    Referring Provider:    Anoop Wills MD, Alejandra Mukherjee MD    Exam:  Airway: clear, no airway problems anticipated  Heart: RRR, without gallops or rubs  Lungs: clear bilaterally without wheezes, crackles, or rhonchi  Abdomen: soft, nontender, nondistended, bowel sounds present  Mental Status: awake, alert and oriented to person, place and time    Sedation:  General anesthesia    Procedure Details:  After discussing in detail the procedure with all its risks and potential complications including but not limited to bleeding, perforation, increased risk of pancreatitis with potential lethal complications related to the pancreatitis, sepsis and multiorgan failure and complications related to sedation, informed consent was obtained. Ample amount of time was allowed for questions and alternatives to this procedure were provided. The patient was taken to the fluoroscopy suite and placed in the prone position. Upon sequential sedation as per above, the Olympus duodenoscope [id number]   was inserted via the mouthpiece and carefully advanced to the stomach where all fluid secretions were suctioned and then the scope advanced to the second portion of the duodenum. The quality of visualization was good. The duodenoscope was withdrawn into a short position. Findings:   Esophagus:normal  Stomach: normal   Duodenum/jejunum: normal    Ampulla:-normal , evidence of periampullary diverticulum  Cholangiogram: Evidence of a complete obstruction seen from the distal common hepatic duct down to about mid common bile duct, about 3 cm in length.  Mild dilation was noted in the proximal hepatic duct and prominent intrahepatic ducts noted. The obstruction appeared to be at the level of the cystic duct take off. The cystic duct and gallbladder were seen filling up with contrast dye. Pancreatogram: Not performed    Specimen Removed:  None    Complications: None. EBL:  None. Interventions:      Pancreatic: none  Biliary: After selective cannulation of the common bile duct using the dreamtome sphincterotome, adequate 8mm sphincterotomy was performed using ERBE cautery. No bleeding occurred. A 0.035 fr guidewire was advanced into the intrahepatic ducts. Two filling defects were seen in the distal CBD suggestive of stones. The sphincterotome was then exchanged over the guidewire with a 9 to 12 mm biliary balloon. A balloon sweep was performed, no stones seen. Occlusion cholangiogram was obtained. No filling defects seen. At this point the balloon was exchanged with a 10 Fr 7 cm plastic stent that was placed under endoscopic and fluoroscopic guidance. Optimal position was obtained. Excellent bile drainage seen. At this point the wire and scope were retrieved. Impression:      Biliary obstruction secondary to extrinsic compression at the junction of the common hepatic and common bile duct, relieved by plastic stent placement. Recommendations:     - Continue to hold clopidogrel until next week and can resume in 3 to 5 days.  - Repeat liver enzymes early next week with Dr. Rajendra Helm  - Will need stent replacement in 2 to 3 months, based on response to therapy stent type will be discussed with her.           Keary Fothergill, MD  11/21/2019  5:11 PM detailed exam

## 2023-05-10 RX ORDER — PANTOPRAZOLE SODIUM 40 MG/1
40 TABLET, DELAYED RELEASE ORAL DAILY
COMMUNITY

## 2023-05-10 RX ORDER — CARVEDILOL 6.25 MG/1
TABLET ORAL 2 TIMES DAILY WITH MEALS
COMMUNITY

## 2023-05-10 RX ORDER — VALACYCLOVIR HYDROCHLORIDE 500 MG/1
TABLET, FILM COATED ORAL 2 TIMES DAILY
COMMUNITY

## 2023-05-10 RX ORDER — ATORVASTATIN CALCIUM 10 MG/1
10 TABLET, FILM COATED ORAL DAILY
COMMUNITY

## 2023-05-10 RX ORDER — EPINEPHRINE 0.3 MG/.3ML
INJECTION SUBCUTANEOUS
COMMUNITY

## 2023-05-10 RX ORDER — CLOPIDOGREL BISULFATE 75 MG/1
75 TABLET ORAL
COMMUNITY

## 2023-05-10 RX ORDER — DULOXETIN HYDROCHLORIDE 60 MG/1
CAPSULE, DELAYED RELEASE ORAL DAILY
COMMUNITY

## 2023-05-10 RX ORDER — AMOXICILLIN 250 MG
1 CAPSULE ORAL PRN
COMMUNITY

## 2023-05-10 RX ORDER — PYRIDOXINE HCL (VITAMIN B6) 100 MG
100 TABLET ORAL DAILY
COMMUNITY

## 2023-05-10 RX ORDER — PROCHLORPERAZINE MALEATE 10 MG
TABLET ORAL EVERY 6 HOURS PRN
COMMUNITY

## 2023-05-10 RX ORDER — AMLODIPINE BESYLATE 10 MG/1
TABLET ORAL DAILY
COMMUNITY

## 2023-05-10 RX ORDER — ALBUTEROL SULFATE 90 UG/1
2 AEROSOL, METERED RESPIRATORY (INHALATION)
COMMUNITY

## 2023-05-10 RX ORDER — EZETIMIBE 10 MG/1
TABLET ORAL
COMMUNITY

## 2023-05-10 RX ORDER — ONDANSETRON HYDROCHLORIDE 8 MG/1
TABLET, FILM COATED ORAL EVERY 8 HOURS PRN
COMMUNITY

## 2023-05-10 RX ORDER — MONTELUKAST SODIUM 10 MG/1
TABLET ORAL DAILY
COMMUNITY

## (undated) DEVICE — POSITIONER HD REST SUPINE LAT

## (undated) DEVICE — COVER LT HNDL PLAS RIG 1 PER PK

## (undated) DEVICE — Device: Brand: ENDO SMARTCAP

## (undated) DEVICE — ADULT SPO2 SENSOR: Brand: NELLCOR

## (undated) DEVICE — TUBING, SUCTION, 1/4" X 10', STRAIGHT: Brand: MEDLINE

## (undated) DEVICE — DEVICE LCK BILI RAP EXCHG OLPS --

## (undated) DEVICE — BW-412T DISP COMBO CLEANING BRUSH: Brand: SINGLE USE COMBINATION CLEANING BRUSH

## (undated) DEVICE — BLOCK BITE BLOX W DENTAL RIM --

## (undated) DEVICE — SOLUTION IRRIG 1000ML H2O STRL BLT

## (undated) DEVICE — Device

## (undated) DEVICE — (D)FCPS GRSP 9MM 230CMLX2.4MM -- DISC BY MFR

## (undated) DEVICE — SPHINCTEROTOME: Brand: DREAMTOME™ RX 44

## (undated) DEVICE — SYR 50ML SLIP TIP NSAF LF STRL --

## (undated) DEVICE — ENDO CARRY-ON PROCEDURE KIT INCLUDES ENZYMATIC SPONGE, GAUZE, BIOHAZARD LABEL, TRAY, LUBRICANT, DIRTY SCOPE LABEL, WATER LABEL, TRAY, DRAWSTRING PAD, AND DEFENDO 4-PIECE KIT.: Brand: ENDO CARRY-ON PROCEDURE KIT

## (undated) DEVICE — KIT,1200CC CANISTER,3/16"X6' TUBING: Brand: MEDLINE INDUSTRIES, INC.

## (undated) DEVICE — REM POLYHESIVE ADULT PATIENT RETURN ELECTRODE: Brand: VALLEYLAB

## (undated) DEVICE — SET GRAV CK VLV NEEDLESS ST 3 GANGED 4WAY STPCOCK HI FLO 10

## (undated) DEVICE — STRAP,POSITIONING,KNEE/BODY,FOAM,4X60": Brand: MEDLINE

## (undated) DEVICE — RETRIEVAL BALLOON CATHETER: Brand: EXTRACTOR™ PRO RX

## (undated) DEVICE — SNARE ENDOSCP M L240CM W27MM SHTH DIA2.4MM CHN 2.8MM OVL

## (undated) DEVICE — INFLATION DEVICE: Brand: ENCORE 26

## (undated) DEVICE — GUIDEWIRE ENDOSCP WRK L450CM DIA0.035IN STD SHFT STR RND

## (undated) DEVICE — STOPCOCK IV 3W --